# Patient Record
Sex: FEMALE | Race: WHITE | NOT HISPANIC OR LATINO | URBAN - METROPOLITAN AREA
[De-identification: names, ages, dates, MRNs, and addresses within clinical notes are randomized per-mention and may not be internally consistent; named-entity substitution may affect disease eponyms.]

---
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
## 2022-01-12 ENCOUNTER — OFFICE VISIT (OUTPATIENT)
Dept: URBAN - NONMETROPOLITAN AREA CLINIC 13 | Facility: CLINIC | Age: 22
End: 2022-01-12

## 2022-03-29 ENCOUNTER — OFFICE VISIT (OUTPATIENT)
Dept: URBAN - NONMETROPOLITAN AREA CLINIC 13 | Facility: CLINIC | Age: 22
End: 2022-03-29

## 2022-03-29 RX ORDER — HYOSCYAMINE SULFATE 0.12 MG/1
TABLET ORAL
Qty: 0 | Refills: 0 | Status: ACTIVE | COMMUNITY
Start: 1900-01-01 | End: 1900-01-01

## 2022-03-29 NOTE — HPI-TODAY'S VISIT:
The patient is a 19 year old /White female, who is referred by Richa Saavedra MD for evaluation of abdominal pain. A copy of this document will be sent to the referring provider. The history was provided by the patient and her mother. The pain began gradually years ago. The pain has been intermittent and is localized to the abdomen diffusely, periumbilical region, and lower abdomen. It does not radiate. It has been moderate in severity and has a sharp and crampy quality.  The pain is reported to be exacerbated by eating and stress. The pain is alleviated by bowel movements, lying down, and removing herself from stressful situation. The patient has no additional symptoms. Symptoms denied: fever, decreased appetite, vomiting, diarrhea, constipation, bleeding per rectum, and no weight loss, has had healthy weight gain in the last 12 months. Relevent conditions and risk factors: none and  Stooling pattern: The patient is currently having 7 stools per week. Stools are formed and soft and are a type 4 on the [place] stool chart. Other characteristics: no visible blood in stools, no visible mucus in stools, no straining with defecation, no fecal soiling, and no rectal pain.  Had labs and stool studies with Dr. Saavedra, these were normal but by mom's report, the TSH was slightly abnormal but T4 was normal. Will request these and review personally She 18 yo who has had intermittent abdominal pain for many years. The pain began at around 12 years old and has been atributed to stress and anxiety. She is a senior and planning to attend [employer] for college this fall. She does not have symptoms of dysphagia or vomiting, no food impacitons. She does not have diarrhea or blood in stool and has not had weight loss. She experiences pain in several locations and it can be minimal to strong and sharp in intensity. She reports triggers are driving into the school parking lot, the smell of her boyfriend's house and stress as triggers. She used to leave school when she had pain and has been advised against this to help her avoid a pattern of avoiding stress. She has not had Psychology therapy such as CBT but is interested in this. She has a late slep onset but sleeps fine once asleep. She has had less pain episodes since the pandemic closed her school but is still having symtpoms. SHe is well in between episodes. She has had labs doneincluding celiac screen and calprotectin and mom reports these are normal. I have not looked at them but will make sure to review these. We had a detailed discussion about IBS, autonomic nervous sytem (fight or flight) and links to anxiety. I also specifically reinforced that the pain is real and cannot be in her head. She and mom are open to different approaches to treatment. We dsicussed diaphragmatic breathing, iberogast, CBT, pepcid trial and a targeted FODMAP trial. No other issues or concerns.

## 2022-05-18 ENCOUNTER — OFFICE VISIT (OUTPATIENT)
Dept: URBAN - NONMETROPOLITAN AREA CLINIC 13 | Facility: CLINIC | Age: 22
End: 2022-05-18

## 2022-05-18 VITALS
WEIGHT: 133.8 LBS | HEART RATE: 73 BPM | SYSTOLIC BLOOD PRESSURE: 123 MMHG | HEIGHT: 69 IN | BODY MASS INDEX: 19.82 KG/M2 | DIASTOLIC BLOOD PRESSURE: 75 MMHG

## 2022-05-18 RX ORDER — HYOSCYAMINE SULFATE 0.12 MG/1
TABLET ORAL
Qty: 0 | Refills: 0 | Status: ACTIVE | COMMUNITY
Start: 1900-01-01 | End: 1900-01-01

## 2022-05-18 NOTE — HPI-TODAY'S VISIT:
The patient is a 19 year old /White female, who is referred by Richa Saavedra MD for evaluation of abdominal pain. A copy of this document will be sent to the referring provider. The history was provided by the patient and her mother. The pain began gradually years ago. The pain has been intermittent and is localized to the abdomen diffusely, periumbilical region, and lower abdomen. It does not radiate. It has been moderate in severity and has a sharp and crampy quality.  The pain is reported to be exacerbated by eating and stress. The pain is alleviated by bowel movements, lying down, and removing herself from stressful situation. The patient has no additional symptoms. Symptoms denied: fever, decreased appetite, vomiting, diarrhea, constipation, bleeding per rectum, and no weight loss, has had healthy weight gain in the last 12 months. Relevent conditions and risk factors: none and  Stooling pattern: The patient is currently having 7 stools per week. Stools are formed and soft and are a type 4 on the [place] stool chart. Other characteristics: no visible blood in stools, no visible mucus in stools, no straining with defecation, no fecal soiling, and no rectal pain.  Had labs and stool studies with Dr. Saavedra, these were normal but by mom's report, the TSH was slightly abnormal but T4 was normal. Will request these and review personally She 18 yo who has had intermittent abdominal pain for many years. The pain began at around 12 years old and has been atributed to stress and anxiety. She is a senior and planning to attend [employer] for college this fall. She does not have symptoms of dysphagia or vomiting, no food impacitons. She does not have diarrhea or blood in stool and has not had weight loss. She experiences pain in several locations and it can be minimal to strong and sharp in intensity. She reports triggers are driving into the school parking lot, the smell of her boyfriend's house and stress as triggers. She used to leave school when she had pain and has been advised against this to help her avoid a pattern of avoiding stress. She has not had Psychology therapy such as CBT but is interested in this. She has a late slep onset but sleeps fine once asleep. She has had less pain episodes since the pandemic closed her school but is still having symtpoms. SHe is well in between episodes. She has had labs doneincluding celiac screen and calprotectin and mom reports these are normal. I have not looked at them but will make sure to review these. We had a detailed discussion about IBS, autonomic nervous sytem (fight or flight) and links to anxiety. I also specifically reinforced that the pain is real and cannot be in her head. She and mom are open to different approaches to treatment. We dsicussed diaphragmatic breathing, iberogast, CBT, pepcid trial and a targeted FODMAP trial. No other issues or concerns.

## 2022-07-12 ENCOUNTER — OFFICE VISIT (OUTPATIENT)
Dept: URBAN - NONMETROPOLITAN AREA CLINIC 13 | Facility: CLINIC | Age: 22
End: 2022-07-12

## 2022-08-15 ENCOUNTER — OFFICE VISIT (OUTPATIENT)
Dept: URBAN - NONMETROPOLITAN AREA CLINIC 13 | Facility: CLINIC | Age: 22
End: 2022-08-15

## 2022-09-01 ENCOUNTER — WEB ENCOUNTER (OUTPATIENT)
Dept: URBAN - NONMETROPOLITAN AREA CLINIC 13 | Facility: CLINIC | Age: 22
End: 2022-09-01

## 2022-09-01 ENCOUNTER — DASHBOARD ENCOUNTERS (OUTPATIENT)
Age: 22
End: 2022-09-01

## 2022-09-01 ENCOUNTER — OFFICE VISIT (OUTPATIENT)
Dept: URBAN - NONMETROPOLITAN AREA CLINIC 13 | Facility: CLINIC | Age: 22
End: 2022-09-01
Payer: COMMERCIAL

## 2022-09-01 VITALS
TEMPERATURE: 97.5 F | DIASTOLIC BLOOD PRESSURE: 73 MMHG | SYSTOLIC BLOOD PRESSURE: 103 MMHG | BODY MASS INDEX: 20.73 KG/M2 | HEIGHT: 69 IN | WEIGHT: 140 LBS | HEART RATE: 87 BPM

## 2022-09-01 DIAGNOSIS — K58.8 OTHER IRRITABLE BOWEL SYNDROME: ICD-10-CM

## 2022-09-01 PROBLEM — 10743008: Status: ACTIVE | Noted: 2022-09-01

## 2022-09-01 PROCEDURE — 99244 OFF/OP CNSLTJ NEW/EST MOD 40: CPT | Performed by: INTERNAL MEDICINE

## 2022-09-01 PROCEDURE — 99214 OFFICE O/P EST MOD 30 MIN: CPT | Performed by: INTERNAL MEDICINE

## 2022-09-01 RX ORDER — HYOSCYAMINE SULFATE 0.12 MG/1
TABLET ORAL
Qty: 0 | Refills: 0 | Status: ACTIVE | COMMUNITY
Start: 1900-01-01 | End: 1900-01-01

## 2022-09-01 RX ORDER — HYOSCYAMINE SULFATE 0.12 MG/1
1 TABLET UNDER THE TONGUE AND ALLOW TO DISSOLVE  AS NEEDED TABLET, ORALLY DISINTEGRATING ORAL THREE TIMES A DAY
Qty: 90 | Refills: 3 | OUTPATIENT
Start: 2022-09-01 | End: 2022-12-29

## 2022-09-01 NOTE — HPI-TODAY'S VISIT:
The patient is a 19 year old /White female, who is referred by Richa Saavedra MD for evaluation of abdominal pain. A copy of this document will be sent to the referring provider. The history was provided by the patient and her mother. The pain began gradually years ago. The pain has been intermittent and is localized to the abdomen diffusely, periumbilical region, and lower abdomen. It does not radiate. It has been moderate in severity and has a sharp and crampy quality.  The pain is reported to be exacerbated by eating and stress. The pain is alleviated by bowel movements, lying down, and removing herself from stressful situation. The patient has no additional symptoms. Symptoms denied: fever, decreased appetite, vomiting, diarrhea, constipation, bleeding per rectum, and no weight loss, has had healthy weight gain in the last 12 months. Relevent conditions and risk factors: none and  Stooling pattern: The patient is currently having 7 stools per week. Stools are formed and soft and are a type 4 on the [place] stool chart. Other characteristics: no visible blood in stools, no visible mucus in stools, no straining with defecation, no fecal soiling, and no rectal pain.  Had labs and stool studies with Dr. Saavedra, these were normal but by mom's report, the TSH was slightly abnormal but T4 was normal. Will request these and review personally She 18 yo who has had intermittent abdominal pain for many years. The pain began at around 12 years old and has been atributed to stress and anxiety. She is a senior and planning to attend [employer] for college this fall. She does not have symptoms of dysphagia or vomiting, no food impacitons. She does not have diarrhea or blood in stool and has not had weight loss. She experiences pain in several locations and it can be minimal to strong and sharp in intensity. She reports triggers are driving into the school parking lot, the smell of her boyfriend's house and stress as triggers. She used to leave school when she had pain and has been advised against this to help her avoid a pattern of avoiding stress. She has not had Psychology therapy such as CBT but is interested in this. She has a late slep onset but sleeps fine once asleep. She has had less pain episodes since the pandemic closed her school but is still having symtpoms. SHe is well in between episodes. She has had labs doneincluding celiac screen and calprotectin and mom reports these are normal. I have not looked at them but will make sure to review these. We had a detailed discussion about IBS, autonomic nervous sytem (fight or flight) and links to anxiety. I also specifically reinforced that the pain is real and cannot be in her head. She and mom are open to different approaches to treatment. We dsicussed diaphragmatic breathing, iberogast, CBT, pepcid trial and a targeted FODMAP trial. No other issues or concerns. 9/1/2022 Jewels is a very pleasant 22-year-old female who was previously seen by Dr. Fowler as a pediatric patient.  She returns today referred by Dr. Ricah Ayon and Dr. Josh Breen.  She is now a brian [employer] after transferring from [employer].  A few years ago she had IBS type symptoms.  Labs were all essentially unremarkable.  She does feel if her symptoms are better now but she still has some intermittent loose watery stools associated with urgency.  She does have some anxiety and is currently on Lexapro which she feels like is significantly helped her GI symptoms.  She denies any blood in her stool, weight loss, or severe abdominal pain.  She used to take hyoscyamine sublingual but she ran out.  She did feel like this helped

## 2023-07-11 ENCOUNTER — TELEPHONE ENCOUNTER (OUTPATIENT)
Dept: URBAN - NONMETROPOLITAN AREA CLINIC 2 | Facility: CLINIC | Age: 23
End: 2023-07-11

## 2023-07-11 RX ORDER — HYOSCYAMINE SULFATE 0.12 MG/1
1 TABLET AS NEEDED TABLET ORAL
Qty: 90 TABLET | Refills: 5